# Patient Record
Sex: FEMALE | Race: WHITE | NOT HISPANIC OR LATINO | URBAN - METROPOLITAN AREA
[De-identification: names, ages, dates, MRNs, and addresses within clinical notes are randomized per-mention and may not be internally consistent; named-entity substitution may affect disease eponyms.]

---

## 2024-01-13 ENCOUNTER — HOSPITAL ENCOUNTER (EMERGENCY)
Facility: HOSPITAL | Age: 29
Discharge: HOME | End: 2024-01-14
Attending: EMERGENCY MEDICINE
Payer: MEDICAID

## 2024-01-13 ENCOUNTER — APPOINTMENT (OUTPATIENT)
Dept: RADIOLOGY | Facility: HOSPITAL | Age: 29
End: 2024-01-13
Payer: MEDICAID

## 2024-01-13 VITALS
TEMPERATURE: 97.9 F | BODY MASS INDEX: 45.32 KG/M2 | DIASTOLIC BLOOD PRESSURE: 78 MMHG | WEIGHT: 272 LBS | HEART RATE: 97 BPM | OXYGEN SATURATION: 100 % | HEIGHT: 65 IN | RESPIRATION RATE: 18 BRPM | SYSTOLIC BLOOD PRESSURE: 129 MMHG

## 2024-01-13 DIAGNOSIS — S92.255A CLOSED NONDISPLACED FRACTURE OF NAVICULAR BONE OF LEFT FOOT, INITIAL ENCOUNTER: Primary | ICD-10-CM

## 2024-01-13 PROCEDURE — 73610 X-RAY EXAM OF ANKLE: CPT | Mod: LT

## 2024-01-13 PROCEDURE — 29515 APPLICATION SHORT LEG SPLINT: CPT | Mod: LT

## 2024-01-13 PROCEDURE — 73630 X-RAY EXAM OF FOOT: CPT | Mod: LEFT SIDE | Performed by: RADIOLOGY

## 2024-01-13 PROCEDURE — 73630 X-RAY EXAM OF FOOT: CPT | Mod: LT

## 2024-01-13 PROCEDURE — 99284 EMERGENCY DEPT VISIT MOD MDM: CPT | Performed by: EMERGENCY MEDICINE

## 2024-01-13 PROCEDURE — 73610 X-RAY EXAM OF ANKLE: CPT | Mod: LEFT SIDE | Performed by: RADIOLOGY

## 2024-01-13 ASSESSMENT — COLUMBIA-SUICIDE SEVERITY RATING SCALE - C-SSRS
6. HAVE YOU EVER DONE ANYTHING, STARTED TO DO ANYTHING, OR PREPARED TO DO ANYTHING TO END YOUR LIFE?: NO
1. IN THE PAST MONTH, HAVE YOU WISHED YOU WERE DEAD OR WISHED YOU COULD GO TO SLEEP AND NOT WAKE UP?: NO
2. HAVE YOU ACTUALLY HAD ANY THOUGHTS OF KILLING YOURSELF?: NO

## 2024-01-13 ASSESSMENT — PAIN DESCRIPTION - ORIENTATION: ORIENTATION: LEFT

## 2024-01-13 ASSESSMENT — PAIN DESCRIPTION - PAIN TYPE: TYPE: ACUTE PAIN

## 2024-01-13 ASSESSMENT — PAIN SCALES - GENERAL
PAINLEVEL_OUTOF10: 5 - MODERATE PAIN
PAINLEVEL_OUTOF10: 7

## 2024-01-13 ASSESSMENT — LIFESTYLE VARIABLES
REASON UNABLE TO ASSESS: NO
EVER HAD A DRINK FIRST THING IN THE MORNING TO STEADY YOUR NERVES TO GET RID OF A HANGOVER: NO
EVER FELT BAD OR GUILTY ABOUT YOUR DRINKING: NO
HAVE YOU EVER FELT YOU SHOULD CUT DOWN ON YOUR DRINKING: NO
HAVE PEOPLE ANNOYED YOU BY CRITICIZING YOUR DRINKING: NO

## 2024-01-13 ASSESSMENT — PAIN - FUNCTIONAL ASSESSMENT: PAIN_FUNCTIONAL_ASSESSMENT: 0-10

## 2024-01-13 ASSESSMENT — PAIN DESCRIPTION - LOCATION: LOCATION: FOOT

## 2024-01-14 NOTE — ED NOTES
Pt given instructions on crutches with crutches given and teach back with demonstration     Jackie Parks RN  01/14/24 4085

## 2024-01-14 NOTE — DISCHARGE INSTRUCTIONS
Please follow-up with your primary care provider as well as orthopedic surgery team.  Return to the emergency department if develop any worsening foot/ankle pain, change in skin color such as pallor, bluish discoloration, tingling, numbness, weakness, or if concerns arise.

## 2024-01-14 NOTE — ED PROVIDER NOTES
HPI   Chief Complaint   Patient presents with    Foot Injury     Rolled left foot       Patient is a 28-year-old female presents the emergency department today after twisting her left ankle with complaint of foot pain.  She states that she excellently twisted her ankle earlier today and reported some foot pain as well as ankle pain.  She did state that she fell without however was able to catch herself.  She did not hit her stomach.  She does report she is 16 weeks pregnant.  She denies any abdominal pain, chest pain, vaginal bleeding.  She denies any numbness or tingling in the toes.       History provided by:  Patient   used: No                        [place] Coma Scale Score: 15                  Patient History   No past medical history on file.  No past surgical history on file.  No family history on file.  Social History     Tobacco Use    Smoking status: Not on file    Smokeless tobacco: Not on file   Substance Use Topics    Alcohol use: Not on file    Drug use: Not on file       Physical Exam   ED Triage Vitals [01/13/24 2247]   Temp Heart Rate Resp BP   36.6 °C (97.9 °F) 97 18 129/78      SpO2 Temp src Heart Rate Source Patient Position   100 % -- -- --      BP Location FiO2 (%)     -- --       Physical Exam  Vitals and nursing note reviewed.   Constitutional:       General: She is not in acute distress.     Appearance: She is well-developed.   HENT:      Head: Normocephalic and atraumatic.   Eyes:      Conjunctiva/sclera: Conjunctivae normal.   Cardiovascular:      Rate and Rhythm: Normal rate and regular rhythm.      Heart sounds: No murmur heard.  Pulmonary:      Effort: Pulmonary effort is normal. No respiratory distress.      Breath sounds: Normal breath sounds.   Abdominal:      Palpations: Abdomen is soft.      Tenderness: There is no abdominal tenderness.   Musculoskeletal:         General: No swelling.      Cervical back: Neck supple.   Skin:     General: Skin is warm and dry.       Capillary Refill: Capillary refill takes less than 2 seconds.   Neurological:      Mental Status: She is alert.   Psychiatric:         Mood and Affect: Mood normal.         ED Course & MDM   ED Course as of 24 0126   Sun  Patient is a 28-year-old female presents the emergency department today due to concern for left foot pain.  On arrival, she was in no acute distress.  Vital signs are stable.  Did get x-ray imaging of her foot and ankle on the left.  Did also do point-of-care ultrasound as patient is pregnant.  Fetal heart rate was approximately 150 on ultrasound imaging.  Baby appeared well. [MJ]   0058 XR ankle left 3+ views [MJ]   0058 XR foot left 3+ views  IMPRESSION:  Acute nondisplaced avulsion fracture of the dorsal navicular.   [MJ]   0058 Patient does appear to have acute nondisplaced avulsion fracture of the dorsal navicular.  Will place patient in posterior short leg splint.  Will also give crutches.  She was instructed follow-up with orthopedics to discuss her ER visit as well as her x-ray imaging and fracture.  Patient was also struck to follow with her primary care provider.  Strict return precautions were given.  Patient was understanding and agreeable with plan for discharge. [MJ]      ED Course User Index  [MJ] Jc Blanco,          Diagnoses as of 24 0126   Closed nondisplaced fracture of navicular bone of left foot, initial encounter       Medical Decision Making  I saw and evaluated the patient. I personally obtained the key and critical portions of the history and physical exam or was physically present for key and critical portions performed by the resident/fellow. I reviewed the resident/fellow's documentation and discussed the patient with the resident/fellow. I agree with the resident/fellow's medical decision making as documented in the note with the exception/addition of the following:  This is a 28 years old female patient  4 para 3 at 16  weeks by gestation.  Presented to the emergency department after she missed a step, twisted her left ankle.  Complaining of left ankle pain and left foot pain.  Denies being on blood thinners, did not hit her head, did not lose consciousness.  Patient still able to bear weight on the left foot.    Review of system: As stated above in the HPI section.    Physical exam revealed a 28 years old female patient does not appear to be in distress  Cardiopulmonary as well as abdominal, neurologic and skin exam are unremarkable.  Patient has intact dorsalis pedis and posterior tibial pulse bilaterally, intact sensation, motor function.    X-ray revealed acute, nondisplaced avulsion fracture of the navicular bone.  Splint is applied, patient is given outpatient orthopedic surgery follow-up and to take Tylenol for pain control and to return to the emergency department if alarming symptoms arise such as change in skin color, numbness, coldness, bluish discoloration, worsening swelling, or if concerns arise.  Jhonny Hsu, DO         Procedure  Procedures     Jhonny Hsu,   01/14/24 0126       Jhonny Hsu DO  01/14/24 0247       Jhonny Hsu DO  01/14/24 0253